# Patient Record
Sex: MALE | Race: WHITE | Employment: PART TIME | URBAN - METROPOLITAN AREA
[De-identification: names, ages, dates, MRNs, and addresses within clinical notes are randomized per-mention and may not be internally consistent; named-entity substitution may affect disease eponyms.]

---
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
## 2021-03-19 ENCOUNTER — TELEPHONE (OUTPATIENT)
Dept: DERMATOLOGY | Facility: CLINIC | Age: 21
End: 2021-03-19

## 2021-03-19 NOTE — TELEPHONE ENCOUNTER
elda stating that she needs to set up her mychart and send in photos in order to do the visit on Monday. Advised that if it is not done we will need to reschedule.     Sena MAJOR CMA

## 2021-03-22 ENCOUNTER — VIRTUAL VISIT (OUTPATIENT)
Dept: DERMATOLOGY | Facility: CLINIC | Age: 21
End: 2021-03-22
Payer: COMMERCIAL

## 2021-03-22 DIAGNOSIS — L73.9 FOLLICULITIS: ICD-10-CM

## 2021-03-22 DIAGNOSIS — L70.0 ACNE VULGARIS: Primary | ICD-10-CM

## 2021-03-22 PROCEDURE — 99203 OFFICE O/P NEW LOW 30 MIN: CPT | Mod: GQ | Performed by: PHYSICIAN ASSISTANT

## 2021-03-22 RX ORDER — TRETINOIN 0.25 MG/G
CREAM TOPICAL
Qty: 45 G | Refills: 1 | Status: SHIPPED | OUTPATIENT
Start: 2021-03-22

## 2021-03-22 NOTE — LETTER
"3/22/2021       RE: Jovanna Walker  [address]  [place] [address]     Dear Colleague,    Thank you for referring your patient, Jovanna Walker, to the [facility] [place] at [facility]. Please see a copy of my visit note below.    [facility] Dermatology Note  Encounter Date: Mar 22, 2021  [employer] and Telephone ([phone]). Location of teledermatologist: [facility] [place].  Start time: 07:28 am End time: 07:41.13 min    Dermatology Problem List:  1. Acne vulgaris/ folliculitis-  tretinoin 0.025% cream at bedtime     ____________________________________________    Assessment & Plan:     # Acne vulgaris forehead/ folliculitis bearded area.  Start tretinoin 0.025% cream to face. Instructed to apply topical acne medication once every other day and increase to nightly as tolerate.  Waiting 20-30 minutes after washing affected area(s) will decrease irritation. Method of application, side effects and expected results were discussed. The patient will apply pea size amount to the entire face, avoid areas around the eyes, corners of nose and mouth. Discussed side effects including photosensitivity and irritation.    -Continue gentle cleansers, moisturizer.       Procedures Performed:    None    Follow-up: 3 month(s) virtually (telephone with photos), or earlier for new or changing lesions    Staff:     All risks, benefits and alternatives were discussed with patient.  Patient is in agreement and understands the assessment and plan.  All questions were answered.  Sun Screen Education was given.   Return to Clinic in 3 months or sooner as needed.   Carmelita Doss PA-C   ____________________________________________    CC: Derm Problem (Jovanna is having a virtual visit today for acne. He states \" On and off for about 1 year I get small, red, and itchy bumps on my " "forheard and it spread to my beard area. My skin was extra dry in the mornings. I am hoping to fingure out how to get rid of it\")    HPI:  Mr. Jovanna Walker is a(n) 20 year old male who presents today as a new patient for bumps on his forehead and bearded areas. He states this has been on and off for 1 year. At maximum he may have ten bumps at time on his forehead. They typically are skin colored. Recently they were red and inflamed. He also gets bumps in his bearded area. He shave with an electric razor. He mentions his skin feels oily in the  Summer and dry in the winter. He uses gentle products such as Aveeno. He has noticed when he uses products with niacinamide that his face worsens and ponders if he has a sensitivity.    No family hx of skin problems.    Patient is otherwise feeling well, without additional skin concerns.    Labs Reviewed:  NA    Physical Exam:  Vitals: There were no vitals taken for this visit.  SKIN: Teledermatology photos were reviewed; image quality and interpretability: Acceptable. Image date: 03/22/21  - There are a few skin colored papules on the forehead   - No other lesions of concern on areas examined.     Medications:  Current Outpatient Medications   Medication     vitamin B complex with vitamin C (VITAMIN  B COMPLEX) tablet     No current facility-administered medications for this visit.       Past Medical/Surgical History:   There is no problem list on file for this patient.    No past medical history on file.    CC Referred Self, MD  No address on file on close of this encounter.                    "

## 2021-03-22 NOTE — PATIENT INSTRUCTIONS
Topical Retinoids    What are topical retinoids?    These are medicines that are related to Vitamin A. They are used on the skin.    Retin-A , Renova , Differin , and Tazorac  are brand names.    Come in creams and clear gels    Used to treat skin conditions like pimples (acne), face wrinkling, or dark-colored sunspots    How do I use these medicines?    Wash face and let dry for 15 to 30 minutes.    Use a large pea-size amount of medicine to cover the whole face. Do not put on close to the eyes and lips.  Rub in gently.     Start by using every other day.  If you have no irritation after a few days, start to use it daily.      You might have too much irritation with daily use. Use it less often until the irritation goes away.  Then try to increase slowly to daily use.     Irritation improves over time.    You may use moisturizer if your skin becomes dry. Look for  non-comedogenic  (non-pore plugging) and oil free products.      What are the side effects?    Dryness     Peeling and flaking     Irritation of the skin     Possible increased chance of sunburns.  Protect your skin from sunlight. Wear a hat and use a sunscreen with SPF 30 or higher. Your sunscreen should have both UVA and UVB (broad-spectrum) protection.

## 2021-03-22 NOTE — NURSING NOTE
"Chief Complaint   Patient presents with     Derm Problem     Jovanna is having a virtual visit today for acne. He states \" On and off for about 1 year I get small, red, and itchy bumps on my forheard and it spread to my beard area. My skin was extra dry in the mornings. I am hoping to fingure out how to get rid of it\"     Gudelia Fuentes, RMBECKA  "

## 2021-03-22 NOTE — PROGRESS NOTES
"[facility] Dermatology Note  Encounter Date: Mar 22, 2021  [employer] and Telephone ([phone]). Location of teledermatologist: [facility] [place].  Start time: 07:28 am End time: 07:41.13 min    Dermatology Problem List:  1. Acne vulgaris/ folliculitis-  tretinoin 0.025% cream at bedtime     ____________________________________________    Assessment & Plan:     # Acne vulgaris forehead/ folliculitis bearded area.  Start tretinoin 0.025% cream to face. Instructed to apply topical acne medication once every other day and increase to nightly as tolerate.  Waiting 20-30 minutes after washing affected area(s) will decrease irritation. Method of application, side effects and expected results were discussed. The patient will apply pea size amount to the entire face, avoid areas around the eyes, corners of nose and mouth. Discussed side effects including photosensitivity and irritation.    -Continue gentle cleansers, moisturizer.       Procedures Performed:    None    Follow-up: 3 month(s) virtually (telephone with photos), or earlier for new or changing lesions    Staff:     All risks, benefits and alternatives were discussed with patient.  Patient is in agreement and understands the assessment and plan.  All questions were answered.  Sun Screen Education was given.   Return to Clinic in 3 months or sooner as needed.   Carmelita Doss PA-C   ____________________________________________    CC: Derm Problem (Jovanna is having a virtual visit today for acne. He states \" On and off for about 1 year I get small, red, and itchy bumps on my forheard and it spread to my beard area. My skin was extra dry in the mornings. I am hoping to fingure out how to get rid of it\")    HPI:  Mr. Jovanna Walker is a(n) 20 year old male who presents today as a new patient for bumps on his forehead and bearded areas. He states this has been on and off for 1 year. At maximum he may " have ten bumps at time on his forehead. They typically are skin colored. Recently they were red and inflamed. He also gets bumps in his bearded area. He shave with an electric razor. He mentions his skin feels oily in the  Summer and dry in the winter. He uses gentle products such as Aveeno. He has noticed when he uses products with niacinamide that his face worsens and ponders if he has a sensitivity.    No family hx of skin problems.    Patient is otherwise feeling well, without additional skin concerns.    Labs Reviewed:  NA    Physical Exam:  Vitals: There were no vitals taken for this visit.  SKIN: Teledermatology photos were reviewed; image quality and interpretability: Acceptable. Image date: 03/22/21  - There are a few skin colored papules on the forehead   - No other lesions of concern on areas examined.     Medications:  Current Outpatient Medications   Medication     vitamin B complex with vitamin C (VITAMIN  B COMPLEX) tablet     No current facility-administered medications for this visit.       Past Medical/Surgical History:   There is no problem list on file for this patient.    No past medical history on file.    CC Referred Self, MD  No address on file on close of this encounter.

## 2021-04-08 ENCOUNTER — IMMUNIZATION (OUTPATIENT)
Dept: LAB | Facility: CLINIC | Age: 21
End: 2021-04-08
Payer: COMMERCIAL

## 2021-04-25 ENCOUNTER — HEALTH MAINTENANCE LETTER (OUTPATIENT)
Age: 21
End: 2021-04-25

## 2021-10-10 ENCOUNTER — HEALTH MAINTENANCE LETTER (OUTPATIENT)
Age: 21
End: 2021-10-10

## 2022-05-21 ENCOUNTER — HEALTH MAINTENANCE LETTER (OUTPATIENT)
Age: 22
End: 2022-05-21

## 2022-09-18 ENCOUNTER — HEALTH MAINTENANCE LETTER (OUTPATIENT)
Age: 22
End: 2022-09-18

## 2023-06-04 ENCOUNTER — HEALTH MAINTENANCE LETTER (OUTPATIENT)
Age: 23
End: 2023-06-04